# Patient Record
Sex: MALE | Race: WHITE | NOT HISPANIC OR LATINO | URBAN - METROPOLITAN AREA
[De-identification: names, ages, dates, MRNs, and addresses within clinical notes are randomized per-mention and may not be internally consistent; named-entity substitution may affect disease eponyms.]

---

## 2018-03-15 ENCOUNTER — FOLLOW UP ESTABLISHED (OUTPATIENT)
Dept: URBAN - METROPOLITAN AREA CLINIC 30 | Facility: CLINIC | Age: 78
End: 2018-03-15
Payer: MEDICARE

## 2018-03-15 DIAGNOSIS — H33.011 RETINAL DETACHMENT WITH SINGLE BREAK, RIGHT EYE: Primary | ICD-10-CM

## 2018-03-15 DIAGNOSIS — H43.393 OTHER VITREOUS OPACITIES, BILATERAL: ICD-10-CM

## 2018-03-15 DIAGNOSIS — H26.492 OTHER SECONDARY CATARACT, LEFT EYE: ICD-10-CM

## 2018-03-15 PROCEDURE — 92133 CPTRZD OPH DX IMG PST SGM ON: CPT | Performed by: OPTOMETRIST

## 2018-03-15 PROCEDURE — 92014 COMPRE OPH EXAM EST PT 1/>: CPT | Performed by: OPTOMETRIST

## 2018-03-15 PROCEDURE — 76514 ECHO EXAM OF EYE THICKNESS: CPT | Performed by: OPTOMETRIST

## 2018-03-15 ASSESSMENT — INTRAOCULAR PRESSURE
OD: 22
OD: 25
OS: 20
OS: 18

## 2018-03-15 ASSESSMENT — VISUAL ACUITY
OS: 20/40
OD: 20/20

## 2018-04-05 ENCOUNTER — Encounter (OUTPATIENT)
Dept: URBAN - METROPOLITAN AREA CLINIC 24 | Facility: CLINIC | Age: 78
End: 2018-04-05
Payer: MEDICARE

## 2018-04-05 DIAGNOSIS — Z01.818 ENCOUNTER FOR OTHER PREPROCEDURAL EXAMINATION: Primary | ICD-10-CM

## 2018-04-05 PROCEDURE — 99213 OFFICE O/P EST LOW 20 MIN: CPT | Performed by: PHYSICIAN ASSISTANT

## 2018-04-12 ENCOUNTER — SURGERY (OUTPATIENT)
Dept: URBAN - METROPOLITAN AREA SURGERY 5 | Facility: SURGERY | Age: 78
End: 2018-04-12
Payer: MEDICARE

## 2018-04-12 PROCEDURE — 66821 AFTER CATARACT LASER SURGERY: CPT | Performed by: OPHTHALMOLOGY

## 2018-07-12 ENCOUNTER — FOLLOW UP ESTABLISHED (OUTPATIENT)
Dept: URBAN - METROPOLITAN AREA CLINIC 30 | Facility: CLINIC | Age: 78
End: 2018-07-12
Payer: MEDICARE

## 2018-07-12 PROCEDURE — 92083 EXTENDED VISUAL FIELD XM: CPT | Performed by: OPTOMETRIST

## 2018-07-12 PROCEDURE — 92012 INTRM OPH EXAM EST PATIENT: CPT | Performed by: OPTOMETRIST

## 2018-07-12 ASSESSMENT — INTRAOCULAR PRESSURE
OD: 18
OS: 18

## 2018-08-02 ENCOUNTER — FOLLOW UP ESTABLISHED (OUTPATIENT)
Dept: URBAN - METROPOLITAN AREA CLINIC 30 | Facility: CLINIC | Age: 78
End: 2018-08-02
Payer: MEDICARE

## 2018-08-02 DIAGNOSIS — H01.025 SQUAMOUS BLEPHARITIS OF LEFT LOWER LID: ICD-10-CM

## 2018-08-02 DIAGNOSIS — H01.022 SQUAMOUS BLEPHARITIS OF RIGHT LOWER LID: ICD-10-CM

## 2018-08-02 DIAGNOSIS — H00.021 HORDEOLUM INTERNUM (MEIBOMIAN GLAND DYSFUNCTION), RIGHT UPPER LID: ICD-10-CM

## 2018-08-02 DIAGNOSIS — H01.021 SQUAMOUS BLEPHARITIS OF RIGHT UPPER LID: ICD-10-CM

## 2018-08-02 DIAGNOSIS — H01.024 SQUAMOUS BLEPHARITIS OF LEFT UPPER LID: ICD-10-CM

## 2018-08-02 PROCEDURE — 92012 INTRM OPH EXAM EST PATIENT: CPT | Performed by: OPTOMETRIST

## 2018-08-02 PROCEDURE — BRUDE BRUDER EYE MOIST COMPRESS: CUSTOM | Performed by: OPTOMETRIST

## 2018-08-02 RX ORDER — ERYTHROMYCIN 5 MG/G
OINTMENT OPHTHALMIC
Qty: 1 | Refills: 0 | Status: INACTIVE
Start: 2018-08-02 | End: 2018-09-06

## 2018-08-02 ASSESSMENT — INTRAOCULAR PRESSURE
OS: 13
OD: 14

## 2018-08-02 ASSESSMENT — VISUAL ACUITY
OS: 20/20
OD: 20/25

## 2018-09-06 ENCOUNTER — FOLLOW UP ESTABLISHED (OUTPATIENT)
Dept: URBAN - METROPOLITAN AREA CLINIC 30 | Facility: CLINIC | Age: 78
End: 2018-09-06
Payer: MEDICARE

## 2018-09-06 PROCEDURE — 99213 OFFICE O/P EST LOW 20 MIN: CPT | Performed by: OPTOMETRIST

## 2018-09-06 ASSESSMENT — INTRAOCULAR PRESSURE
OS: 19
OD: 26

## 2018-10-09 ENCOUNTER — FOLLOW UP ESTABLISHED (OUTPATIENT)
Dept: URBAN - METROPOLITAN AREA CLINIC 30 | Facility: CLINIC | Age: 78
End: 2018-10-09
Payer: MEDICARE

## 2018-10-09 PROCEDURE — 99213 OFFICE O/P EST LOW 20 MIN: CPT | Performed by: OPTOMETRIST

## 2018-10-09 RX ORDER — DORZOLAMIDE HCL 20 MG/ML
2 % SOLUTION/ DROPS OPHTHALMIC
Qty: 15 | Refills: 3 | Status: INACTIVE
Start: 2018-10-09 | End: 2019-06-07

## 2018-10-09 ASSESSMENT — INTRAOCULAR PRESSURE
OD: 13
OS: 14
OD: 14

## 2019-02-08 ENCOUNTER — FOLLOW UP ESTABLISHED (OUTPATIENT)
Dept: URBAN - METROPOLITAN AREA CLINIC 30 | Facility: CLINIC | Age: 79
End: 2019-02-08
Payer: MEDICARE

## 2019-02-08 PROCEDURE — 99213 OFFICE O/P EST LOW 20 MIN: CPT | Performed by: OPTOMETRIST

## 2019-02-08 ASSESSMENT — INTRAOCULAR PRESSURE
OD: 16
OS: 14

## 2019-06-07 ENCOUNTER — FOLLOW UP ESTABLISHED (OUTPATIENT)
Dept: URBAN - METROPOLITAN AREA CLINIC 30 | Facility: CLINIC | Age: 79
End: 2019-06-07
Payer: MEDICARE

## 2019-06-07 DIAGNOSIS — H00.025 HORDEOLUM INTERNUM LEFT LOWER EYELID: ICD-10-CM

## 2019-06-07 DIAGNOSIS — H35.371 PUCKERING OF MACULA, RIGHT EYE: ICD-10-CM

## 2019-06-07 DIAGNOSIS — Z79.84 LONG TERM (CURRENT) USE OF ORAL ANTIDIABETIC DRUGS: ICD-10-CM

## 2019-06-07 DIAGNOSIS — E11.3211 DIABETES MELLITUS TYPE 2 WITH MILD NON-PROLIFERATI: ICD-10-CM

## 2019-06-07 PROCEDURE — 92134 CPTRZ OPH DX IMG PST SGM RTA: CPT | Performed by: OPTOMETRIST

## 2019-06-07 PROCEDURE — 92014 COMPRE OPH EXAM EST PT 1/>: CPT | Performed by: OPTOMETRIST

## 2019-06-07 RX ORDER — METFORMIN HYDROCHLORIDE 500 MG/1
500 MG TABLET, FILM COATED ORAL
Qty: 0 | Refills: 0 | Status: ACTIVE
Start: 2019-06-07

## 2019-06-07 RX ORDER — DORZOLAMIDE HCL 20 MG/ML
2 % SOLUTION/ DROPS OPHTHALMIC
Qty: 15 | Refills: 3 | Status: INACTIVE
Start: 2019-06-07 | End: 2020-02-04

## 2019-06-07 ASSESSMENT — INTRAOCULAR PRESSURE
OD: 18
OS: 16

## 2019-06-07 ASSESSMENT — VISUAL ACUITY
OS: 20/25
OD: 20/60

## 2019-06-07 ASSESSMENT — KERATOMETRY
OS: 45.42
OD: 45.33

## 2019-06-27 ENCOUNTER — FOLLOW UP ESTABLISHED (OUTPATIENT)
Dept: URBAN - METROPOLITAN AREA CLINIC 30 | Facility: CLINIC | Age: 79
End: 2019-06-27
Payer: MEDICARE

## 2019-06-27 PROCEDURE — 92235 FLUORESCEIN ANGRPH MLTIFRAME: CPT | Performed by: OPHTHALMOLOGY

## 2019-06-27 PROCEDURE — 92004 COMPRE OPH EXAM NEW PT 1/>: CPT | Performed by: OPHTHALMOLOGY

## 2019-06-27 PROCEDURE — 92134 CPTRZ OPH DX IMG PST SGM RTA: CPT | Performed by: OPHTHALMOLOGY

## 2019-06-27 ASSESSMENT — INTRAOCULAR PRESSURE
OS: 14
OD: 14

## 2019-09-19 ENCOUNTER — FOLLOW UP ESTABLISHED (OUTPATIENT)
Dept: URBAN - METROPOLITAN AREA CLINIC 30 | Facility: CLINIC | Age: 79
End: 2019-09-19
Payer: MEDICARE

## 2019-09-19 PROCEDURE — 92014 COMPRE OPH EXAM EST PT 1/>: CPT | Performed by: OPHTHALMOLOGY

## 2020-01-27 ENCOUNTER — FOLLOW UP ESTABLISHED (OUTPATIENT)
Dept: URBAN - METROPOLITAN AREA CLINIC 30 | Facility: CLINIC | Age: 80
End: 2020-01-27
Payer: MEDICARE

## 2020-01-27 DIAGNOSIS — H00.024 HORDEOLUM INTERNUM LEFT UPPER EYELID: ICD-10-CM

## 2020-01-27 DIAGNOSIS — H00.022 HORDEOLUM INTERNUM RIGHT LOWER EYELID: ICD-10-CM

## 2020-01-27 PROCEDURE — 92134 CPTRZ OPH DX IMG PST SGM RTA: CPT | Performed by: OPTOMETRIST

## 2020-01-27 PROCEDURE — 92014 COMPRE OPH EXAM EST PT 1/>: CPT | Performed by: OPTOMETRIST

## 2020-01-27 ASSESSMENT — INTRAOCULAR PRESSURE
OS: 14
OD: 15

## 2020-01-27 ASSESSMENT — VISUAL ACUITY
OD: 20/30
OS: 20/30

## 2020-01-27 ASSESSMENT — KERATOMETRY
OS: 44.56
OD: 45.33

## 2022-03-24 ENCOUNTER — OFFICE VISIT (OUTPATIENT)
Dept: URBAN - METROPOLITAN AREA CLINIC 30 | Facility: CLINIC | Age: 82
End: 2022-03-24
Payer: MEDICARE

## 2022-03-24 DIAGNOSIS — H40.013 OPEN ANGLE WITH BORDERLINE FINDINGS, LOW RISK, BILATERAL: ICD-10-CM

## 2022-03-24 PROCEDURE — 92014 COMPRE OPH EXAM EST PT 1/>: CPT | Performed by: OPTOMETRIST

## 2022-03-24 PROCEDURE — 92134 CPTRZ OPH DX IMG PST SGM RTA: CPT | Performed by: OPTOMETRIST

## 2022-03-24 ASSESSMENT — INTRAOCULAR PRESSURE
OS: 15
OD: 17

## 2022-03-24 ASSESSMENT — VISUAL ACUITY
OD: 20/30
OS: 20/30

## 2022-03-24 ASSESSMENT — KERATOMETRY
OD: 45.45
OS: 44.80

## 2022-03-24 NOTE — IMPRESSION/PLAN
Impression: Diabetes mellitus Type 2 with mild non-proliferative retinopathy with macular edema, right eye Plan: Diabetes Non-Insulin: Mild non-proliferative diabetic retinopathy. Emphasized importance of strict BS control, diet, exercise, and BP control to avoid risk of loss of vision. Current A1C unknown per pt.

## 2022-03-24 NOTE — IMPRESSION/PLAN
Impression: Open angle with borderline findings, low risk, bilateral TMAX 26,21 +Fhx?
   -Denies asthma +Heart dz Plan: IOP stable OU. Previously d/c'd Lumigan due to irritation and hyperemia. Cont Dorzolamide bid OU. RNFL 6/2019 OD) abnormal Inf; borderline sup OS) borderline temp VF 7/2018 low test reliability- OD) borderline GHT; OS) enlarged BS- update next visit

## 2022-03-24 NOTE — IMPRESSION/PLAN
Impression: Puckering of macula, right eye Plan: ERM OD - asymptomatic with no distortion. Cont to monitor w AG.

## 2022-03-24 NOTE — IMPRESSION/PLAN
Impression: Retinal detachment with single break, right eye Plan: s/p repair - remains stable. RD precautions emphasized.  Observe

## 2022-03-24 NOTE — IMPRESSION/PLAN
Impression: Long term (current) use of oral antidiabetic drugs Plan: see other plan for clinical correlations

## 2022-03-24 NOTE — IMPRESSION/PLAN
Impression: Hordeolum internum (Meibomian gland dysfunction), right upper lid Plan: Continue WC's qhs OU and Erythromycin stephanie qhs OU. Cont AT's qid OU. Avoid ceiling fans. +Computer user. Generated new SRX today.

## 2022-09-15 ENCOUNTER — OFFICE VISIT (OUTPATIENT)
Dept: URBAN - METROPOLITAN AREA CLINIC 30 | Facility: CLINIC | Age: 82
End: 2022-09-15
Payer: MEDICARE

## 2022-09-15 DIAGNOSIS — H40.013 OPEN ANGLE WITH BORDERLINE FINDINGS, LOW RISK, BILATERAL: Primary | ICD-10-CM

## 2022-09-15 PROCEDURE — 92014 COMPRE OPH EXAM EST PT 1/>: CPT | Performed by: OPTOMETRIST

## 2022-09-15 ASSESSMENT — INTRAOCULAR PRESSURE
OD: 15
OS: 14

## 2022-09-15 NOTE — IMPRESSION/PLAN
Impression: Open angle with borderline findings, low risk, bilateral TMAX 26,21 +Fhx?
   -Denies asthma +Heart dz Plan: IOP stable OU. Previously d/c'd Lumigan due to irritation and hyperemia. Cont Dorzolamide bid OU. New VF defect today. Repeat VF in 3 mos. RNFL 6/2019 OD) abnormal Inf; borderline sup OS) borderline temp VF 7/2018 low test reliability- OD) borderline GHT; OS) enlarged BS-
VF 9/2022 OD Sup ARc Defect- new? Progression? High FL; OS Paracentral defect?